# Patient Record
(demographics unavailable — no encounter records)

---

## 2024-11-11 NOTE — DATA REVIEWED
[FreeTextEntry1] : 3 views of the patient's right foot ordered and reviewed by me personally.  I also ordered and reviewed 3 views of the patient's right ankle.  X-rays of the right ankle with weightbearing do not demonstrate any interval displacement.  The ankle joint is congruent.  There is a distal fibular fracture without displacement.  Foot x-rays not demonstrate any fracture or dislocation.

## 2024-11-11 NOTE — DISCUSSION/SUMMARY
[de-identified] : I discussed the patient's clinical radiographic findings with him.  We can initiate fracture care for this injury.  He can weight-bear as tolerated in the cam boot.  He will see me back in 1 month.  All questions sought and answered.  He can remove the boot to work on range of motion and ice and elevate.

## 2024-11-11 NOTE — HISTORY OF PRESENT ILLNESS
[de-identified] : 39-year-old patient comes to see me regards to his right ankle.  He is seeing me for the first time.  He injured himself 2 weeks ago was seen at urgent care where he was placed inside a boot for a minimally displaced distal fibula fracture.  He is also endorsing some pain involving the foot today.  He has been compliant with wearing the boot and he has been nonweightbearing.  Does not endorse any calf pain chest pain or shortness of breath.

## 2024-11-11 NOTE — PHYSICAL EXAM
[de-identified] : He is alert oriented x 3.  He is pleasant cooperative.  I examined his right lower extremity.  The patient is tender to palpation over the lateral malleolus and to some degree over the midfoot.  No instability noted.  There is swelling present but compartments are soft compressible.  Neurovascular intact distally.

## 2024-12-09 NOTE — DISCUSSION/SUMMARY
[de-identified] : At this point the patient can begin weaning into a brace.  Start physical therapy.  I will see him back in 1 month for his next fracture care appointment.  All questions answered.

## 2024-12-09 NOTE — HISTORY OF PRESENT ILLNESS
[de-identified] : Patient follow-up with this a fibula fracture.  Has been weightbearing with the boot.  Only mild pain at times and actually localizes the pain medially mostly.

## 2024-12-09 NOTE — DATA REVIEWED
[FreeTextEntry1] : 3 views of the right ankle ordered reviewed by me personally.  X-rays demonstrate evidence of a healing fracture of the lateral malleolus.  Ankle joint is congruent.

## 2024-12-09 NOTE — PHYSICAL EXAM
[de-identified] : Tender mostly over the deltoid ligament.  Nontender laterally over the lateral malleolus.  No bony crepitus.  Near full range of motion.  Ankle is stable.  Neurovascular intact.

## 2025-01-06 NOTE — DATA REVIEWED
[FreeTextEntry1] : 3 views of the right ankle ordered reviewed by me personally.  X-rays demonstrate a healing lateral malleolus fracture without interval displacement.  Ankle joints congruent.

## 2025-01-06 NOTE — HISTORY OF PRESENT ILLNESS
[de-identified] : Patient here for follow-up of his right ankle fracture.  He is doing much better.  He is doing physical therapy.

## 2025-01-06 NOTE — PHYSICAL EXAM
[de-identified] : Minimally tender lateral malleolus.  Grossly intact range of motion.  Neurovascular intact.

## 2025-01-06 NOTE — DISCUSSION/SUMMARY
[de-identified] : At this point he can wean off the brace completely.  I will see him back in 1 month for final fracture care appointment.  All questions sought and answered

## 2025-02-10 NOTE — HISTORY OF PRESENT ILLNESS
[de-identified] : 39-year-old patient comes to see me regards to his right ankle.  The patient is doing a home exercise program but still has pain.  He is getting better however.

## 2025-02-10 NOTE — DISCUSSION/SUMMARY
[de-identified] : I recommend he do a formal physical therapy program to address his soft tissue pathology.  I will see him back in 4 weeks for reassessment.  All questions sought and answered.

## 2025-02-10 NOTE — DATA REVIEWED
[FreeTextEntry1] : 3 views of the patient's right ankle ordered reviewed by me personally.  X-rays demonstrate evidence of a healed lateral malleolus fracture.  No interval displacement.

## 2025-03-24 NOTE — HISTORY OF PRESENT ILLNESS
[de-identified] : Patient is here for follow-up of his right ankle.  Doing better.  Less pain.  Still occasional some discomfort.  Doing physical therapy.

## 2025-03-24 NOTE — DISCUSSION/SUMMARY
[de-identified] : Patient can follow-up as needed at this point.  There were no restrictions.  Continue physical therapy to finish out his sessions.  I will see him back in as-needed basis.

## 2025-03-24 NOTE — PHYSICAL EXAM
[de-identified] : Ankle is minimally tender.  No step-off.  Full range of motion and neurovascular intact.